# Patient Record
Sex: FEMALE | Race: OTHER | HISPANIC OR LATINO
[De-identification: names, ages, dates, MRNs, and addresses within clinical notes are randomized per-mention and may not be internally consistent; named-entity substitution may affect disease eponyms.]

---

## 2017-12-31 ENCOUNTER — TRANSCRIPTION ENCOUNTER (OUTPATIENT)
Age: 67
End: 2017-12-31

## 2018-04-20 ENCOUNTER — TRANSCRIPTION ENCOUNTER (OUTPATIENT)
Age: 68
End: 2018-04-20

## 2019-11-26 ENCOUNTER — TRANSCRIPTION ENCOUNTER (OUTPATIENT)
Age: 69
End: 2019-11-26

## 2021-03-26 ENCOUNTER — NON-APPOINTMENT (OUTPATIENT)
Age: 71
End: 2021-03-26

## 2021-03-26 ENCOUNTER — APPOINTMENT (OUTPATIENT)
Dept: OPHTHALMOLOGY | Facility: CLINIC | Age: 71
End: 2021-03-26
Payer: MEDICARE

## 2021-03-26 PROCEDURE — 92004 COMPRE OPH EXAM NEW PT 1/>: CPT

## 2021-03-26 PROCEDURE — 99072 ADDL SUPL MATRL&STAF TM PHE: CPT

## 2021-04-22 ENCOUNTER — APPOINTMENT (OUTPATIENT)
Dept: OPHTHALMOLOGY | Facility: CLINIC | Age: 71
End: 2021-04-22
Payer: MEDICARE

## 2021-04-22 ENCOUNTER — NON-APPOINTMENT (OUTPATIENT)
Age: 71
End: 2021-04-22

## 2021-04-22 PROCEDURE — 92012 INTRM OPH EXAM EST PATIENT: CPT

## 2021-04-22 PROCEDURE — 92015 DETERMINE REFRACTIVE STATE: CPT

## 2021-04-22 PROCEDURE — 99072 ADDL SUPL MATRL&STAF TM PHE: CPT

## 2021-09-27 ENCOUNTER — TRANSCRIPTION ENCOUNTER (OUTPATIENT)
Age: 71
End: 2021-09-27

## 2021-09-27 PROBLEM — Z00.00 ENCOUNTER FOR PREVENTIVE HEALTH EXAMINATION: Status: ACTIVE | Noted: 2021-09-27

## 2022-04-18 ENCOUNTER — APPOINTMENT (OUTPATIENT)
Dept: PAIN MANAGEMENT | Facility: CLINIC | Age: 72
End: 2022-04-18
Payer: MEDICARE

## 2022-04-18 PROCEDURE — 62323 NJX INTERLAMINAR LMBR/SAC: CPT

## 2022-04-18 NOTE — PROCEDURE
[FreeTextEntry3] : Date of Service: 04/18/2022 \par \par Account: [id number]\par \par Patient: JENNIFER COLORADO \par \par YOB: 1950\par \par Age: 72 year\par \par \par Surgeon:                                                         Lance Gross D.O.\par \par Pre-Operative Diagnosis:                             Lumbosacral radiculitis\par \par Post-Operative Diagnosis:                           Same\par \par Procedure:                                                      Interlaminar lumbar epidural steroid injection (L3-4) under fluoroscopic guidance\par \par Anesthesia:                                                     Local with MAC\par \par \par This procedure was carried out using fluoroscopic guidance.  The risks and benefits of the procedure were discussed extensively with the patient.  The consent of the patient was obtained and the following procedure was performed.\par \par The patient was placed in the prone position.  The lumbar area was prepped and draped in a sterile fashion.  A timeout was performed with all essential staff present and the site and side were verified. Under AP view with slight cephalad-caudad angulation, the L3-4 interspace was identified and marked.  Using sterile technique, the superficial skin was anesthetized with 1% Lidocaine without epinephrine.  A 20-gauge Tuohy needle was advanced into the epidural space under fluoroscopy using [id number] technique and using loss of resistance at the L3-4 level.  After negative aspiration for heme or CSF, an epidurogram was obtained using 2-3 cc Omnipaque contrast injected under live fluoroscopy, confirming epidural placement of the needle. Epidurogram showed no evidence of intrathecal or intravascular flow, and good evidence of bilateral epidural flow from L3-S2 levels. \par \par After this, 4 cc of preservative free normal saline plus 12 mg of betamethasone were injected into the epidural space.\par \par The needle was subsequently removed.  Anesthesia personnel were present throughout the procedure.\par \par The patient tolerated the procedure well and was instructed to contact me immediately if there were any problems.\par \par Lance Gross D.O.\par

## 2022-05-03 ENCOUNTER — APPOINTMENT (OUTPATIENT)
Dept: PAIN MANAGEMENT | Facility: CLINIC | Age: 72
End: 2022-05-03

## 2022-05-23 ENCOUNTER — APPOINTMENT (OUTPATIENT)
Dept: PAIN MANAGEMENT | Facility: CLINIC | Age: 72
End: 2022-05-23

## 2022-06-13 ENCOUNTER — APPOINTMENT (OUTPATIENT)
Dept: PAIN MANAGEMENT | Facility: CLINIC | Age: 72
End: 2022-06-13
Payer: MEDICARE

## 2022-06-13 VITALS — BODY MASS INDEX: 25.52 KG/M2 | HEIGHT: 60 IN | WEIGHT: 130 LBS

## 2022-06-13 PROCEDURE — 99214 OFFICE O/P EST MOD 30 MIN: CPT

## 2022-06-13 RX ORDER — MELOXICAM 15 MG/1
15 TABLET ORAL
Qty: 30 | Refills: 0 | Status: ACTIVE | COMMUNITY
Start: 2022-06-13 | End: 1900-01-01

## 2022-06-13 NOTE — PHYSICAL EXAM
[de-identified] : Constitutional; Appears well, no apparent distress\par Ability to communicate: Normal \par Respiratory: non-labored breathing\par Skin: No rash noted\par Head: Normocephalic, atraumatic\par Neck: no visible thyroid enlargement\par Eyes: Extraocular movements intact\par Neurologic: Alert and oriented x3\par Psychiatric: normal mood, affect and behavior \par \par  [] : uses walker

## 2022-06-13 NOTE — DISCUSSION/SUMMARY
[de-identified] : After discussing various treatment options with the patient including but not limited to oral medications, physical therapy, exercise modalities as well as interventional spinal injections, we have decided with the following plan:\par \par - Continue Home exercises, stretching, activity modification, physical therapy, and conservative care.\par - Recommend L3-4 Lumbar Epidural Steroid Injection under fluoroscopic guidance with image.\par - The risks, benefits and alternatives of the proposed procedure were explained in detail with the patient. The risks outlined include but are not limited to infection, bleeding, post-dural puncture headache, nerve injury, a temporary increase in pain, failure to resolve symptoms, allergic reaction, symptom recurrence, and possible elevation of blood sugar in diabetics. All questions were answered to patient's apparent satisfaction and he/she verbalized an understanding.\par - Patient is presenting with acute/sub-acute radicular pain with impairment in ADLs and functionality.  The pain has not responded to conservative care including NSAID therapy and/or physical therapy.  There is no bleeding tendency, unstable medical condition, or systemic infection.\par - Follow up in 1-2 weeks post injection for re-evaluation.\par \par - Recommend Meloxicam 15mg daily PRN. Potential adverse effects including but not limited to bleeding, ulcers, increased risk of hypertension, heart disease, kidney disease and stroke were discussed with the patient.  Medication would allow patient to be more mobile and perform ADL's.  Will continue to monitor patient and attempt to wean as soon as possible. Will use the lowest dosage possible for the shortest possible period of time.\par \par

## 2022-06-13 NOTE — HISTORY OF PRESENT ILLNESS
[Lower back] : lower back [9] : 9 [Radiating] : radiating [Tingling] : tingling [Constant] : constant [Household chores] : household chores [Leisure] : leisure [Meds] : meds [Injection therapy] : injection therapy [] : yes [FreeTextEntry1] : b/l  [FreeTextEntry6] : numbness [FreeTextEntry7] : b/l back of the buttock, legs to the calf

## 2022-06-27 ENCOUNTER — APPOINTMENT (OUTPATIENT)
Dept: PAIN MANAGEMENT | Facility: CLINIC | Age: 72
End: 2022-06-27
Payer: MEDICARE

## 2022-06-27 PROCEDURE — 62323 NJX INTERLAMINAR LMBR/SAC: CPT

## 2022-06-27 NOTE — PROCEDURE
[FreeTextEntry3] : Date of Service: 06/27/2022 \par \par Account: [id number]\par \par Patient: JENNIFER COLORADO \par \par YOB: 1950\par \par Age: 72 year\par \par \par Surgeon:                                                         Lance Gross D.O.\par \par Pre-Operative Diagnosis:                             Lumbosacral radiculitis\par \par Post-Operative Diagnosis:                           Same\par \par Procedure:                                                      Interlaminar lumbar epidural steroid injection (L3-4) under fluoroscopic guidance\par \par Anesthesia:                                                     Local with MAC\par \par \par This procedure was carried out using fluoroscopic guidance.  The risks and benefits of the procedure were discussed extensively with the patient.  The consent of the patient was obtained and the following procedure was performed.\par \par The patient was placed in the prone position.  The lumbar area was prepped and draped in a sterile fashion.  A timeout was performed with all essential staff present and the site and side were verified. Under AP view with slight cephalad-caudad angulation, the L3-4 interspace was identified and marked.  Using sterile technique, the superficial skin was anesthetized with 1% Lidocaine without epinephrine.  A 20-gauge Tuohy needle was advanced into the epidural space under fluoroscopy using [id number] technique and using loss of resistance at the L3-4 level.  After negative aspiration for heme or CSF, 4 cc of preservative free normal saline plus 12 mg of betamethasone were injected into the epidural space.\par \par In consideration of the current national shortage of contrast agents and after careful assessment of the R/B/A the decision was made to proceed with the above indicated procedure without the use of such.  This is in accord with current published pain societal guidelines for management of interventional pain procedures during this national shortage.\par \par The needle was subsequently removed.  Anesthesia personnel were present throughout the procedure.\par \par The patient tolerated the procedure well and was instructed to contact me immediately if there were any problems.\par \par Lance Gross D.O.\par

## 2022-07-11 ENCOUNTER — APPOINTMENT (OUTPATIENT)
Dept: PAIN MANAGEMENT | Facility: CLINIC | Age: 72
End: 2022-07-11

## 2022-07-11 VITALS — WEIGHT: 132 LBS | BODY MASS INDEX: 25.91 KG/M2 | HEIGHT: 60 IN

## 2022-07-11 DIAGNOSIS — M54.17 RADICULOPATHY, LUMBOSACRAL REGION: ICD-10-CM

## 2022-07-11 DIAGNOSIS — M54.50 LOW BACK PAIN, UNSPECIFIED: ICD-10-CM

## 2022-07-11 DIAGNOSIS — Z87.09 PERSONAL HISTORY OF OTHER DISEASES OF THE RESPIRATORY SYSTEM: ICD-10-CM

## 2022-07-11 DIAGNOSIS — Z86.79 PERSONAL HISTORY OF OTHER DISEASES OF THE CIRCULATORY SYSTEM: ICD-10-CM

## 2022-07-11 PROCEDURE — 99213 OFFICE O/P EST LOW 20 MIN: CPT

## 2022-07-11 NOTE — HISTORY OF PRESENT ILLNESS
[Lower back] : lower back [Radiating] : radiating [Tingling] : tingling [Constant] : constant [Household chores] : household chores [Leisure] : leisure [Meds] : meds [Injection therapy] : injection therapy [] : yes [7] : 7 [0] : 0 [Sharp] : sharp [Standing] : standing [Walking] : walking [FreeTextEntry1] : b/l  [FreeTextEntry6] : numbness [FreeTextEntry7] : b/l back of the buttock, legs to the calf

## 2022-07-11 NOTE — PHYSICAL EXAM
[de-identified] : Constitutional; Appears well, no apparent distress\par Ability to communicate: Normal \par Respiratory: non-labored breathing\par Skin: No rash noted\par Head: Normocephalic, atraumatic\par Neck: no visible thyroid enlargement\par Eyes: Extraocular movements intact\par Neurologic: Alert and oriented x3\par Psychiatric: normal mood, affect and behavior \par \par  [] : negative sitting straight leg raise

## 2022-07-11 NOTE — DISCUSSION/SUMMARY
[de-identified] : After discussing various treatment options with the patient including but not limited to oral medications, physical therapy, exercise modalities as well as interventional spinal injections, we have decided with the following plan:\par \par - Continue home exercises, stretching, activity modification, physical therapy, and conservative care.\par - Follow-up as needed.\par - Continue Meloxicam 15mg daily PRN. Potential adverse effects including but not limited to bleeding, ulcers, increased risk of hypertension, heart disease, kidney disease and stroke were discussed with the patient.  Medication would allow patient to be more mobile and perform ADL's.  Will continue to monitor patient and attempt to wean as soon as possible. Will use the lowest dosage possible for the shortest possible period of time.\par \par

## 2022-08-02 ENCOUNTER — NON-APPOINTMENT (OUTPATIENT)
Age: 72
End: 2022-08-02

## 2023-02-18 ENCOUNTER — NON-APPOINTMENT (OUTPATIENT)
Age: 73
End: 2023-02-18

## 2024-04-10 ENCOUNTER — APPOINTMENT (OUTPATIENT)
Dept: OTOLARYNGOLOGY | Facility: CLINIC | Age: 74
End: 2024-04-10
Payer: MEDICARE

## 2024-04-10 VITALS
BODY MASS INDEX: 21.52 KG/M2 | HEIGHT: 61 IN | OXYGEN SATURATION: 98 % | WEIGHT: 114 LBS | DIASTOLIC BLOOD PRESSURE: 77 MMHG | HEART RATE: 73 BPM | SYSTOLIC BLOOD PRESSURE: 132 MMHG

## 2024-04-10 DIAGNOSIS — H61.23 IMPACTED CERUMEN, BILATERAL: ICD-10-CM

## 2024-04-10 DIAGNOSIS — H90.3 SENSORINEURAL HEARING LOSS, BILATERAL: ICD-10-CM

## 2024-04-10 DIAGNOSIS — H92.03 OTALGIA, BILATERAL: ICD-10-CM

## 2024-04-10 DIAGNOSIS — R07.0 PAIN IN THROAT: ICD-10-CM

## 2024-04-10 DIAGNOSIS — R49.0 DYSPHONIA: ICD-10-CM

## 2024-04-10 PROCEDURE — G0268 REMOVAL OF IMPACTED WAX MD: CPT

## 2024-04-10 PROCEDURE — 92567 TYMPANOMETRY: CPT

## 2024-04-10 PROCEDURE — 99204 OFFICE O/P NEW MOD 45 MIN: CPT | Mod: 25

## 2024-04-10 PROCEDURE — 31575 DIAGNOSTIC LARYNGOSCOPY: CPT

## 2024-04-10 PROCEDURE — 92557 COMPREHENSIVE HEARING TEST: CPT

## 2024-04-10 NOTE — PHYSICAL EXAM
[Midline] : trachea located in midline position [Normal] : no rashes [de-identified] : cerumen removed from the right and left EACs with currette, normal EACs otherwise

## 2024-04-10 NOTE — ASSESSMENT
[FreeTextEntry1] : Patient long history of dead left ear has a hearing aid of many years in the right he feels that it is not working as well on examination cerumen impaction curetted out bilaterally also complaining of some raspiness of her voice fiberoptic evaluation of the larynx is perfectly normal no abnormality of her vocal cords good function no tumors masses or polyps patient was reassured audiogram was performed recommend and cleared for hearing aid if she is new hearing aid if she so desires.

## 2024-04-10 NOTE — HISTORY OF PRESENT ILLNESS
[de-identified] : Patient wears a right ear hearing aid (left ear complete loss )but even with the hearing aid she feels she cannot hear well. When she removes it, the right ear feels clogged and cant even hear her own voice. She reports hearing an echo.  She has had a lot of recurrent throat soreness and has a deeper voice than usual recently.  She has not had any recent hearing test to see if the right ear hearing has changed.  She does have pain in both ears.

## 2024-04-10 NOTE — END OF VISIT
[FreeTextEntry3] : I, Dr. Thakur personally performed the evaluation and management (E/M) services including all necessary procedures, for this new patient. That E/M includes conducting the clinically appropriate initial history &/or exam, assessing all conditions, and establishing the plan of care. Today, my ALY, Sara Jesus, was here to observe &/or participate in the visit & follow plan of care established by me.

## 2024-04-10 NOTE — REVIEW OF SYSTEMS
[Negative] : Heme/Lymph [Ear Pain] : ear pain [As Noted in HPI] : as noted in HPI [Throat Pain] : throat pain

## 2024-04-10 NOTE — CONSULT LETTER
[Dear  ___] : Dear  [unfilled], [Consult Letter:] : I had the pleasure of evaluating your patient, [unfilled]. [Please see my note below.] : Please see my note below. [Consult Closing:] : Thank you very much for allowing me to participate in the care of this patient.  If you have any questions, please do not hesitate to contact me. [Sincerely,] : Sincerely, [FreeTextEntry3] : Drake Thakur MD [facility] Associated Professor, [employer]

## 2024-09-19 ENCOUNTER — OUTPATIENT (OUTPATIENT)
Dept: OUTPATIENT SERVICES | Facility: HOSPITAL | Age: 74
LOS: 1 days | End: 2024-09-19
Payer: MEDICARE

## 2024-09-19 ENCOUNTER — APPOINTMENT (OUTPATIENT)
Dept: UROLOGY | Facility: CLINIC | Age: 74
End: 2024-09-19
Payer: MEDICARE

## 2024-09-19 VITALS
BODY MASS INDEX: 23.16 KG/M2 | RESPIRATION RATE: 17 BRPM | HEIGHT: 60 IN | SYSTOLIC BLOOD PRESSURE: 157 MMHG | OXYGEN SATURATION: 98 % | WEIGHT: 118 LBS | DIASTOLIC BLOOD PRESSURE: 81 MMHG | HEART RATE: 95 BPM

## 2024-09-19 DIAGNOSIS — Z90.49 ACQUIRED ABSENCE OF OTHER SPECIFIED PARTS OF DIGESTIVE TRACT: ICD-10-CM

## 2024-09-19 DIAGNOSIS — K63.5 POLYP OF COLON: ICD-10-CM

## 2024-09-19 DIAGNOSIS — N39.0 URINARY TRACT INFECTION, SITE NOT SPECIFIED: ICD-10-CM

## 2024-09-19 DIAGNOSIS — Z82.49 FAMILY HISTORY OF ISCHEMIC HEART DISEASE AND OTHER DISEASES OF THE CIRCULATORY SYSTEM: ICD-10-CM

## 2024-09-19 DIAGNOSIS — R82.71 BACTERIURIA: ICD-10-CM

## 2024-09-19 DIAGNOSIS — J45.909 UNSPECIFIED ASTHMA, UNCOMPLICATED: ICD-10-CM

## 2024-09-19 DIAGNOSIS — Z78.9 OTHER SPECIFIED HEALTH STATUS: ICD-10-CM

## 2024-09-19 DIAGNOSIS — N12 TUBULO-INTERSTITIAL NEPHRITIS, NOT SPECIFIED AS ACUTE OR CHRONIC: ICD-10-CM

## 2024-09-19 PROCEDURE — 51701 INSERT BLADDER CATHETER: CPT

## 2024-09-19 PROCEDURE — 99203 OFFICE O/P NEW LOW 30 MIN: CPT | Mod: 25

## 2024-09-19 RX ORDER — ESCITALOPRAM OXALATE 20 MG/1
20 TABLET ORAL
Refills: 0 | Status: ACTIVE | COMMUNITY

## 2024-09-19 RX ORDER — LOSARTAN POTASSIUM 100 MG/1
100 TABLET, FILM COATED ORAL
Refills: 0 | Status: ACTIVE | COMMUNITY

## 2024-09-19 RX ORDER — AMLODIPINE BESYLATE 10 MG/1
10 TABLET ORAL
Refills: 0 | Status: ACTIVE | COMMUNITY

## 2024-09-19 RX ORDER — FENOFIBRATE 134 MG/1
134 CAPSULE ORAL
Refills: 0 | Status: ACTIVE | COMMUNITY

## 2024-09-19 RX ORDER — ROSUVASTATIN CALCIUM 20 MG/1
20 TABLET, FILM COATED ORAL
Refills: 0 | Status: ACTIVE | COMMUNITY

## 2024-09-22 LAB
APPEARANCE: ABNORMAL
BACTERIA: ABNORMAL /HPF
BILIRUBIN URINE: NEGATIVE
BLOOD URINE: NEGATIVE
CAST: 0 /LPF
COLOR: YELLOW
EPITHELIAL CELLS: 2 /HPF
GLUCOSE QUALITATIVE U: NEGATIVE MG/DL
KETONES URINE: NEGATIVE MG/DL
LEUKOCYTE ESTERASE URINE: ABNORMAL
MICROSCOPIC-UA: NORMAL
NITRITE URINE: NEGATIVE
PH URINE: 7
PROTEIN URINE: NEGATIVE MG/DL
RED BLOOD CELLS URINE: 0 /HPF
SPECIFIC GRAVITY URINE: 1.02
UROBILINOGEN URINE: 0.2 MG/DL
WHITE BLOOD CELLS URINE: 2 /HPF

## 2024-09-23 LAB — BACTERIA UR CULT: ABNORMAL

## 2024-09-23 RX ORDER — NITROFURANTOIN (MONOHYDRATE/MACROCRYSTALS) 25; 75 MG/1; MG/1
100 CAPSULE ORAL TWICE DAILY
Qty: 6 | Refills: 2 | Status: ACTIVE | COMMUNITY
Start: 2024-09-23 | End: 1900-01-01

## 2024-09-25 NOTE — HISTORY OF PRESENT ILLNESS
[FreeTextEntry1] : 2024: JENNIFER COLORADO is a 74 year-old F who presents with c/o UTI referred by Nephrologist. Bots[place]an speaking, here with niece as  by choice. Did not want language line. Not Diabetic and Never smoker has HTN  Was admitted x2 in March and April at [facility] for kidney infection with fever/chills/vomiting/lethargic. Was not in renal failure. 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 Also, there are NO RECORDS here at all. - Admitted for x4-5 days and was sent home on abx.  - The infection was "almost going into her bloodstream", which sounds like a pyelo: unclear if she had SIRS.  After hospitalization was seeing nephrologist for recurrent UTI and repeatedly c/o burning. Nephrologist referred her to urologist.  FMHx of renal failure.   Not on abx today Today symptoms only burning and malodorous urine; burning even when not voiding.  Denies stones or obstruction Admits to chronic constipation  Not up to date with mammograms, not up to date with gyn.  FMHx Sister with breast ca dx'ed at 58, alive at 61yo who has had x2 surgeries, one of them being a mastectomy Daughter with breast ca, dx'ed at 34yo No FMHx uterine ovarian ca  voids x3's/day  Recently (24) she visited the GI, she has polyps in her intestines. She will have a repeat colonoscopy to remove the polyps.  Denies blood in stool Surgical h/o laparoscopic cholecystectomy at [facility] (24)  - During this time it was also found that she has polyps? (I believe colonic) and the kidney infection with the following symptoms: unintentionally losing weight, loss of appetite, vomiting.  x6 months ago in March she weighed about 138-140 lbs, since then she got as low as 111 lbs but now she is at 118 lbs.  Never had GH, kidney stones, UTI hospitalization as a child or prior febrile UTI Does not know if she was full term child.  She is off of abx today and not symptomatic, except for chronic burning. Last week she was given abx for UTI and most recently took them x7 days ago.   Also, c/o sometimes can't hold her urine. She does not have sensation and then has incontinence Also, w chronic back pain.   Last voided x3 hours ago.  ``````````````````````````````````

## 2024-09-25 NOTE — LETTER BODY
[Dear  ___] : Dear  [unfilled], [Consult Letter:] : I had the pleasure of evaluating your patient, [unfilled]. [Please see my note below.] : Please see my note below. [Consult Closing:] : Thank you very much for allowing me to participate in the care of this patient.  If you have any questions, please do not hesitate to contact me. [FreeTextEntry1] : Please see my note. I will keep you informed. [FreeTextEntry3] : Sincerely,  Olivia Walter MD Clinical  [facility]

## 2024-09-25 NOTE — ADDENDUM
[FreeTextEntry1] : This note was partly authored by Michel Gonzalez working as a scribe for LUCERO Mc. I, LUCERO Mc, have reviewed the content of this note and confirm it is true and accurate. I personally performed the history and physical examination and made all the decisions. 09/19/2024.

## 2024-09-25 NOTE — HISTORY OF PRESENT ILLNESS
[FreeTextEntry1] : 2024: JENNIFER COLORADO is a 74 year-old F who presents with c/o UTI referred by Nephrologist. Senegalese speaking, here with niece as  by choice. Did not want language line. Not Diabetic and Never smoker has HTN  Was admitted x2 in March and April at [facility] for kidney infection with fever/chills/vomiting/lethargic. Was not in renal failure. 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 Also, there are NO RECORDS here at all. - Admitted for x4-5 days and was sent home on abx.  - The infection was "almost going into her bloodstream", which sounds like a pyelo: unclear if she had SIRS.  After hospitalization was seeing nephrologist for recurrent UTI and repeatedly c/o burning. Nephrologist referred her to urologist.  FMHx of renal failure.   Not on abx today Today symptoms only burning and malodorous urine; burning even when not voiding.  Denies stones or obstruction Admits to chronic constipation  Not up to date with mammograms, not up to date with gyn.  FMHx Sister with breast ca dx'ed at 58, alive at 61yo who has had x2 surgeries, one of them being a mastectomy Daughter with breast ca, dx'ed at 32yo No FMHx uterine ovarian ca  voids x3's/day  Recently (24) she visited the GI, she has polyps in her intestines. She will have a repeat colonoscopy to remove the polyps.  Denies blood in stool Surgical h/o laparoscopic cholecystectomy at [facility] (24)  - During this time it was also found that she has polyps? (I believe colonic) and the kidney infection with the following symptoms: unintentionally losing weight, loss of appetite, vomiting.  x6 months ago in March she weighed about 138-140 lbs, since then she got as low as 111 lbs but now she is at 118 lbs.  Never had GH, kidney stones, UTI hospitalization as a child or prior febrile UTI Does not know if she was full term child.  She is off of abx today and not symptomatic, except for chronic burning. Last week she was given abx for UTI and most recently took them x7 days ago.   Also, c/o sometimes can't hold her urine. She does not have sensation and then has incontinence Also, w chronic back pain.   Last voided x3 hours ago.  ``````````````````````````````````

## 2024-09-25 NOTE — ASSESSMENT
[FreeTextEntry1] :  We discussed UTI prevention strategies: - Drink >2.7L of water per day - Manage and maintain normal bowels: can take daily and/or alternate MiraLAX, Benefiber - Talked about what is known about cranberry, Ellura etc.  Also discussed topical hormone treatment (part of UTI prevention strategy) - Instructed her to consult gyn for mammogram given her FMHx. She can only be prescribed estradiol cream after being cleared based on her mammogram and her FMHx.   Explained timed voiding, a behavioral practice where you void about every x3 hours each day even when she does not have a strong urge to void. Pt expresses understanding. If she goes more often and avoids holding too long, meggan due to lack of sensation, maybe she will not leak as often or as much. Recommend catheterized,sterilize obtained specimen to ensure no surrounding bacteria, "colonizers, or normal chucky: are collected and treated unnecessarily.  She understood. This visit and requesting paperwork from hospital took one hr and 2 minutes separate from education and any other procedure.

## 2024-09-25 NOTE — LETTER BODY
[Dear  ___] : Dear  [unfilled], [Consult Letter:] : I had the pleasure of evaluating your patient, [unfilled]. [Please see my note below.] : Please see my note below. [Consult Closing:] : Thank you very much for allowing me to participate in the care of this patient.  If you have any questions, please do not hesitate to contact me. [FreeTextEntry1] : Please see my note. I will keep you informed. [FreeTextEntry3] : Sincerely,  Olivia Walter MD Clinical  [facility]

## 2024-09-25 NOTE — HISTORY OF PRESENT ILLNESS
[FreeTextEntry1] : 2024: JENNIFER COLORADO is a 74 year-old F who presents with c/o UTI referred by Nephrologist. [place]an speaking, here with niece as  by choice. Did not want language line. Not Diabetic and Never smoker has HTN  Was admitted x2 in March and April at [facility] for kidney infection with fever/chills/vomiting/lethargic. Was not in renal failure. 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 Also, there are NO RECORDS here at all. - Admitted for x4-5 days and was sent home on abx.  - The infection was "almost going into her bloodstream", which sounds like a pyelo: unclear if she had SIRS.  After hospitalization was seeing nephrologist for recurrent UTI and repeatedly c/o burning. Nephrologist referred her to urologist.  FMHx of renal failure.   Not on abx today Today symptoms only burning and malodorous urine; burning even when not voiding.  Denies stones or obstruction Admits to chronic constipation  Not up to date with mammograms, not up to date with gyn.  FMHx Sister with breast ca dx'ed at 58, alive at 59yo who has had x2 surgeries, one of them being a mastectomy Daughter with breast ca, dx'ed at 34yo No FMHx uterine ovarian ca  voids x3's/day  Recently (24) she visited the GI, she has polyps in her intestines. She will have a repeat colonoscopy to remove the polyps.  Denies blood in stool Surgical h/o laparoscopic cholecystectomy at [facility] (24)  - During this time it was also found that she has polyps? (I believe colonic) and the kidney infection with the following symptoms: unintentionally losing weight, loss of appetite, vomiting.  x6 months ago in March she weighed about 138-140 lbs, since then she got as low as 111 lbs but now she is at 118 lbs.  Never had GH, kidney stones, UTI hospitalization as a child or prior febrile UTI Does not know if she was full term child.  She is off of abx today and not symptomatic, except for chronic burning. Last week she was given abx for UTI and most recently took them x7 days ago.   Also, c/o sometimes can't hold her urine. She does not have sensation and then has incontinence Also, w chronic back pain.   Last voided x3 hours ago.  ``````````````````````````````````

## 2024-09-25 NOTE — LETTER BODY
[Dear  ___] : Dear  [unfilled], [Consult Letter:] : I had the pleasure of evaluating your patient, [unfilled]. [Please see my note below.] : Please see my note below. [Consult Closing:] : Thank you very much for allowing me to participate in the care of this patient.  If you have any questions, please do not hesitate to contact me. [FreeTextEntry1] : Please see my note. I will keep you informed. [FreeTextEntry3] : Sincerely,  Olivia Walter MD Clinical  [facility]

## 2024-09-27 DIAGNOSIS — R35.0 FREQUENCY OF MICTURITION: ICD-10-CM

## 2024-10-03 DIAGNOSIS — N39.0 URINARY TRACT INFECTION, SITE NOT SPECIFIED: ICD-10-CM

## 2024-10-03 DIAGNOSIS — N12 TUBULO-INTERSTITIAL NEPHRITIS, NOT SPECIFIED AS ACUTE OR CHRONIC: ICD-10-CM

## 2024-10-03 DIAGNOSIS — R82.71 BACTERIURIA: ICD-10-CM

## 2024-10-16 ENCOUNTER — APPOINTMENT (OUTPATIENT)
Dept: PULMONOLOGY | Facility: CLINIC | Age: 74
End: 2024-10-16
Payer: MEDICARE

## 2024-10-16 ENCOUNTER — LABORATORY RESULT (OUTPATIENT)
Age: 74
End: 2024-10-16

## 2024-10-16 VITALS
WEIGHT: 119 LBS | OXYGEN SATURATION: 99 % | DIASTOLIC BLOOD PRESSURE: 78 MMHG | HEIGHT: 58 IN | SYSTOLIC BLOOD PRESSURE: 154 MMHG | BODY MASS INDEX: 24.98 KG/M2 | HEART RATE: 91 BPM

## 2024-10-16 DIAGNOSIS — R06.02 SHORTNESS OF BREATH: ICD-10-CM

## 2024-10-16 DIAGNOSIS — R05.9 COUGH, UNSPECIFIED: ICD-10-CM

## 2024-10-16 DIAGNOSIS — J45.909 UNSPECIFIED ASTHMA, UNCOMPLICATED: ICD-10-CM

## 2024-10-16 PROCEDURE — 94060 EVALUATION OF WHEEZING: CPT

## 2024-10-16 PROCEDURE — 94729 DIFFUSING CAPACITY: CPT

## 2024-10-16 PROCEDURE — ZZZZZ: CPT

## 2024-10-16 PROCEDURE — 99204 OFFICE O/P NEW MOD 45 MIN: CPT | Mod: 25,GC

## 2024-10-16 PROCEDURE — 36415 COLL VENOUS BLD VENIPUNCTURE: CPT

## 2024-10-16 PROCEDURE — 94726 PLETHYSMOGRAPHY LUNG VOLUMES: CPT

## 2024-10-17 LAB
ALBUMIN SERPL ELPH-MCNC: 4.9 G/DL
ALP BLD-CCNC: 82 U/L
ALT SERPL-CCNC: 20 U/L
ANION GAP SERPL CALC-SCNC: 17 MMOL/L
AST SERPL-CCNC: 22 U/L
BASOPHILS # BLD AUTO: 0.04 K/UL
BASOPHILS NFR BLD AUTO: 0.5 %
BILIRUB SERPL-MCNC: 0.2 MG/DL
BUN SERPL-MCNC: 18 MG/DL
CALCIUM SERPL-MCNC: 9.9 MG/DL
CHLORIDE SERPL-SCNC: 101 MMOL/L
CO2 SERPL-SCNC: 23 MMOL/L
CREAT SERPL-MCNC: 1.17 MG/DL
EGFR: 49 ML/MIN/1.73M2
EOSINOPHIL # BLD AUTO: 0.13 K/UL
EOSINOPHIL NFR BLD AUTO: 1.5 %
GLUCOSE SERPL-MCNC: 73 MG/DL
HCT VFR BLD CALC: 39.3 %
HGB BLD-MCNC: 12.6 G/DL
IMM GRANULOCYTES NFR BLD AUTO: 0.2 %
LYMPHOCYTES # BLD AUTO: 2.71 K/UL
LYMPHOCYTES NFR BLD AUTO: 31.7 %
MAN DIFF?: NORMAL
MCHC RBC-ENTMCNC: 31.6 PG
MCHC RBC-ENTMCNC: 32.1 GM/DL
MCV RBC AUTO: 98.5 FL
MONOCYTES # BLD AUTO: 0.54 K/UL
MONOCYTES NFR BLD AUTO: 6.3 %
NEUTROPHILS # BLD AUTO: 5.12 K/UL
NEUTROPHILS NFR BLD AUTO: 59.8 %
PLATELET # BLD AUTO: 273 K/UL
POTASSIUM SERPL-SCNC: 4.5 MMOL/L
PROT SERPL-MCNC: 7.5 G/DL
RBC # BLD: 3.99 M/UL
RBC # FLD: 12.4 %
SODIUM SERPL-SCNC: 142 MMOL/L
WBC # FLD AUTO: 8.56 K/UL

## 2024-10-21 LAB
A ALTERNATA IGE QN: <0.1 KUA/L
A FUMIGATUS IGE QN: <0.1 KUA/L
C ALBICANS IGE QN: <0.1 KUA/L
C HERBARUM IGE QN: <0.1 KUA/L
CAT DANDER IGE QN: <0.1 KUA/L
COMMON RAGWEED IGE QN: <0.1 KUA/L
D FARINAE IGE QN: <0.1 KUA/L
D PTERONYSS IGE QN: <0.1 KUA/L
DEPRECATED A ALTERNATA IGE RAST QL: 0
DEPRECATED A FUMIGATUS IGE RAST QL: 0
DEPRECATED C ALBICANS IGE RAST QL: 0
DEPRECATED C HERBARUM IGE RAST QL: 0
DEPRECATED CAT DANDER IGE RAST QL: 0
DEPRECATED COMMON RAGWEED IGE RAST QL: 0
DEPRECATED D FARINAE IGE RAST QL: 0
DEPRECATED D PTERONYSS IGE RAST QL: 0
DEPRECATED DOG DANDER IGE RAST QL: 0
DEPRECATED M RACEMOSUS IGE RAST QL: 0
DEPRECATED ROACH IGE RAST QL: 2
DEPRECATED TIMOTHY IGE RAST QL: 0
DEPRECATED WHITE OAK IGE RAST QL: 2
DOG DANDER IGE QN: <0.1 KUA/L
M RACEMOSUS IGE QN: <0.1 KUA/L
ROACH IGE QN: 1.82 KUA/L
TIMOTHY IGE QN: <0.1 KUA/L
TOTAL IGE SMQN RAST: 13 KU/L
WHITE OAK IGE QN: 1.08 KUA/L